# Patient Record
Sex: FEMALE | Race: WHITE | Employment: FULL TIME | URBAN - METROPOLITAN AREA
[De-identification: names, ages, dates, MRNs, and addresses within clinical notes are randomized per-mention and may not be internally consistent; named-entity substitution may affect disease eponyms.]

---

## 2024-07-03 ENCOUNTER — HOSPITAL ENCOUNTER (INPATIENT)
Age: 19
LOS: 2 days | Discharge: HOME | End: 2024-07-05
Attending: PSYCHIATRY & NEUROLOGY | Admitting: PSYCHIATRY & NEUROLOGY

## 2024-07-03 ENCOUNTER — HOSPITAL ENCOUNTER (EMERGENCY)
Age: 19
Discharge: ANOTHER HOSPITAL | End: 2024-07-03
Attending: STUDENT IN AN ORGANIZED HEALTH CARE EDUCATION/TRAINING PROGRAM

## 2024-07-03 VITALS
WEIGHT: 120 LBS | HEART RATE: 57 BPM | DIASTOLIC BLOOD PRESSURE: 80 MMHG | OXYGEN SATURATION: 98 % | RESPIRATION RATE: 14 BRPM | SYSTOLIC BLOOD PRESSURE: 131 MMHG | TEMPERATURE: 97.6 F

## 2024-07-03 DIAGNOSIS — E83.42 HYPOMAGNESEMIA: ICD-10-CM

## 2024-07-03 DIAGNOSIS — T50.904A OVERDOSE OF UNDETERMINED INTENT, INITIAL ENCOUNTER: ICD-10-CM

## 2024-07-03 DIAGNOSIS — R45.851 SUICIDAL IDEATION: Primary | ICD-10-CM

## 2024-07-03 PROBLEM — F32.A DEPRESSION, UNSPECIFIED: Status: ACTIVE | Noted: 2024-07-03

## 2024-07-03 LAB
ALBUMIN SERPL-MCNC: 4.3 G/DL (ref 3.4–5)
ALBUMIN/GLOB SERPL: 1.6 {RATIO} (ref 1.1–2.2)
ALP SERPL-CCNC: 54 U/L (ref 40–129)
ALT SERPL-CCNC: 13 U/L (ref 10–40)
AMPHETAMINES UR QL SCN>1000 NG/ML: ABNORMAL
ANION GAP SERPL CALCULATED.3IONS-SCNC: 12 MMOL/L (ref 3–16)
APAP SERPL-MCNC: 12 UG/ML (ref 10–30)
APAP SERPL-MCNC: <5 UG/ML (ref 10–30)
AST SERPL-CCNC: 17 U/L (ref 15–37)
BARBITURATES UR QL SCN>200 NG/ML: ABNORMAL
BASOPHILS # BLD: 0 K/UL (ref 0–0.2)
BASOPHILS NFR BLD: 0.4 %
BENZODIAZ UR QL SCN>200 NG/ML: ABNORMAL
BILIRUB SERPL-MCNC: 0.4 MG/DL (ref 0–1)
BUN SERPL-MCNC: 12 MG/DL (ref 7–20)
CALCIUM SERPL-MCNC: 9 MG/DL (ref 8.3–10.6)
CANNABINOIDS UR QL SCN>50 NG/ML: POSITIVE
CHLORIDE SERPL-SCNC: 100 MMOL/L (ref 99–110)
CO2 SERPL-SCNC: 22 MMOL/L (ref 21–32)
COCAINE UR QL SCN: ABNORMAL
CREAT SERPL-MCNC: 0.7 MG/DL (ref 0.6–1.1)
DEPRECATED RDW RBC AUTO: 13.2 % (ref 12.4–15.4)
DRUG SCREEN COMMENT UR-IMP: ABNORMAL
EKG ATRIAL RATE: 80 BPM
EKG DIAGNOSIS: NORMAL
EKG P AXIS: 71 DEGREES
EKG P-R INTERVAL: 166 MS
EKG Q-T INTERVAL: 394 MS
EKG QRS DURATION: 80 MS
EKG QTC CALCULATION (BAZETT): 454 MS
EKG R AXIS: 59 DEGREES
EKG T AXIS: 30 DEGREES
EKG VENTRICULAR RATE: 80 BPM
EOSINOPHIL # BLD: 0 K/UL (ref 0–0.6)
EOSINOPHIL NFR BLD: 0.5 %
ETHANOLAMINE SERPL-MCNC: NORMAL MG/DL (ref 0–0.08)
FENTANYL SCREEN, URINE: ABNORMAL
GFR SERPLBLD CREATININE-BSD FMLA CKD-EPI: >90 ML/MIN/{1.73_M2}
GLUCOSE SERPL-MCNC: 115 MG/DL (ref 70–99)
HCG SERPL QL: NEGATIVE
HCT VFR BLD AUTO: 38.2 % (ref 36–48)
HGB BLD-MCNC: 13.1 G/DL (ref 12–16)
LYMPHOCYTES # BLD: 1.3 K/UL (ref 1–5.1)
LYMPHOCYTES NFR BLD: 16.3 %
MAGNESIUM SERPL-MCNC: 1.6 MG/DL (ref 1.8–2.4)
MCH RBC QN AUTO: 31.1 PG (ref 26–34)
MCHC RBC AUTO-ENTMCNC: 34.2 G/DL (ref 31–36)
MCV RBC AUTO: 90.8 FL (ref 80–100)
METHADONE UR QL SCN>300 NG/ML: ABNORMAL
MONOCYTES # BLD: 0.5 K/UL (ref 0–1.3)
MONOCYTES NFR BLD: 6.2 %
NEUTROPHILS # BLD: 5.9 K/UL (ref 1.7–7.7)
NEUTROPHILS NFR BLD: 76.6 %
OPIATES UR QL SCN>300 NG/ML: ABNORMAL
OXYCODONE UR QL SCN: ABNORMAL
PCP UR QL SCN>25 NG/ML: ABNORMAL
PH UR STRIP: 6 [PH]
PLATELET # BLD AUTO: 186 K/UL (ref 135–450)
PMV BLD AUTO: 8.4 FL (ref 5–10.5)
POTASSIUM SERPL-SCNC: 3.5 MMOL/L (ref 3.5–5.1)
PROT SERPL-MCNC: 7 G/DL (ref 6.4–8.2)
RBC # BLD AUTO: 4.21 M/UL (ref 4–5.2)
SALICYLATES SERPL-MCNC: <0.3 MG/DL (ref 15–30)
SARS-COV-2 RDRP RESP QL NAA+PROBE: NOT DETECTED
SODIUM SERPL-SCNC: 134 MMOL/L (ref 136–145)
WBC # BLD AUTO: 7.8 K/UL (ref 4–11)

## 2024-07-03 PROCEDURE — 93005 ELECTROCARDIOGRAM TRACING: CPT | Performed by: STUDENT IN AN ORGANIZED HEALTH CARE EDUCATION/TRAINING PROGRAM

## 2024-07-03 PROCEDURE — 6370000000 HC RX 637 (ALT 250 FOR IP): Performed by: STUDENT IN AN ORGANIZED HEALTH CARE EDUCATION/TRAINING PROGRAM

## 2024-07-03 PROCEDURE — 99285 EMERGENCY DEPT VISIT HI MDM: CPT

## 2024-07-03 PROCEDURE — 96365 THER/PROPH/DIAG IV INF INIT: CPT

## 2024-07-03 PROCEDURE — 85025 COMPLETE CBC W/AUTO DIFF WBC: CPT

## 2024-07-03 PROCEDURE — 87635 SARS-COV-2 COVID-19 AMP PRB: CPT

## 2024-07-03 PROCEDURE — 80143 DRUG ASSAY ACETAMINOPHEN: CPT

## 2024-07-03 PROCEDURE — 96366 THER/PROPH/DIAG IV INF ADDON: CPT

## 2024-07-03 PROCEDURE — 6360000002 HC RX W HCPCS: Performed by: STUDENT IN AN ORGANIZED HEALTH CARE EDUCATION/TRAINING PROGRAM

## 2024-07-03 PROCEDURE — 84703 CHORIONIC GONADOTROPIN ASSAY: CPT

## 2024-07-03 PROCEDURE — 1240000000 HC EMOTIONAL WELLNESS R&B

## 2024-07-03 PROCEDURE — 93010 ELECTROCARDIOGRAM REPORT: CPT | Performed by: INTERNAL MEDICINE

## 2024-07-03 PROCEDURE — 83735 ASSAY OF MAGNESIUM: CPT

## 2024-07-03 PROCEDURE — 82077 ASSAY SPEC XCP UR&BREATH IA: CPT

## 2024-07-03 PROCEDURE — 6370000000 HC RX 637 (ALT 250 FOR IP)

## 2024-07-03 PROCEDURE — 80053 COMPREHEN METABOLIC PANEL: CPT

## 2024-07-03 PROCEDURE — 80179 DRUG ASSAY SALICYLATE: CPT

## 2024-07-03 PROCEDURE — 36415 COLL VENOUS BLD VENIPUNCTURE: CPT

## 2024-07-03 PROCEDURE — 80307 DRUG TEST PRSMV CHEM ANLYZR: CPT

## 2024-07-03 RX ORDER — HYDROXYZINE HYDROCHLORIDE 25 MG/1
50 TABLET, FILM COATED ORAL 3 TIMES DAILY PRN
Status: CANCELLED | OUTPATIENT
Start: 2024-07-03

## 2024-07-03 RX ORDER — DIPHENHYDRAMINE HYDROCHLORIDE 50 MG/ML
50 INJECTION INTRAMUSCULAR; INTRAVENOUS EVERY 4 HOURS PRN
Status: DISCONTINUED | OUTPATIENT
Start: 2024-07-03 | End: 2024-07-04

## 2024-07-03 RX ORDER — RISPERIDONE 1 MG/1
1 TABLET ORAL 2 TIMES DAILY
Status: ON HOLD | COMMUNITY
End: 2024-07-05 | Stop reason: HOSPADM

## 2024-07-03 RX ORDER — TRAZODONE HYDROCHLORIDE 50 MG/1
50 TABLET ORAL NIGHTLY PRN
Status: DISCONTINUED | OUTPATIENT
Start: 2024-07-03 | End: 2024-07-05 | Stop reason: HOSPADM

## 2024-07-03 RX ORDER — MAGNESIUM HYDROXIDE/ALUMINUM HYDROXICE/SIMETHICONE 120; 1200; 1200 MG/30ML; MG/30ML; MG/30ML
30 SUSPENSION ORAL EVERY 6 HOURS PRN
Status: CANCELLED | OUTPATIENT
Start: 2024-07-03

## 2024-07-03 RX ORDER — MAGNESIUM HYDROXIDE/ALUMINUM HYDROXICE/SIMETHICONE 120; 1200; 1200 MG/30ML; MG/30ML; MG/30ML
30 SUSPENSION ORAL EVERY 6 HOURS PRN
Status: DISCONTINUED | OUTPATIENT
Start: 2024-07-03 | End: 2024-07-05 | Stop reason: HOSPADM

## 2024-07-03 RX ORDER — MAGNESIUM SULFATE IN WATER 40 MG/ML
2000 INJECTION, SOLUTION INTRAVENOUS ONCE
Status: COMPLETED | OUTPATIENT
Start: 2024-07-03 | End: 2024-07-03

## 2024-07-03 RX ORDER — HYDROXYZINE 50 MG/1
50 TABLET, FILM COATED ORAL 3 TIMES DAILY PRN
Status: DISCONTINUED | OUTPATIENT
Start: 2024-07-03 | End: 2024-07-05 | Stop reason: HOSPADM

## 2024-07-03 RX ORDER — ACETAMINOPHEN 325 MG/1
650 TABLET ORAL EVERY 4 HOURS PRN
Status: DISCONTINUED | OUTPATIENT
Start: 2024-07-03 | End: 2024-07-04

## 2024-07-03 RX ORDER — IBUPROFEN 400 MG/1
400 TABLET ORAL EVERY 6 HOURS PRN
Status: DISCONTINUED | OUTPATIENT
Start: 2024-07-03 | End: 2024-07-04

## 2024-07-03 RX ORDER — POLYETHYLENE GLYCOL 3350 17 G
2 POWDER IN PACKET (EA) ORAL
Status: CANCELLED | OUTPATIENT
Start: 2024-07-03

## 2024-07-03 RX ORDER — NICOTINE 21 MG/24HR
1 PATCH, TRANSDERMAL 24 HOURS TRANSDERMAL DAILY
Status: DISCONTINUED | OUTPATIENT
Start: 2024-07-03 | End: 2024-07-03 | Stop reason: HOSPADM

## 2024-07-03 RX ORDER — IBUPROFEN 400 MG/1
400 TABLET ORAL EVERY 6 HOURS PRN
Status: CANCELLED | OUTPATIENT
Start: 2024-07-03

## 2024-07-03 RX ORDER — OLANZAPINE 5 MG/1
5 TABLET ORAL EVERY 4 HOURS PRN
Status: DISCONTINUED | OUTPATIENT
Start: 2024-07-03 | End: 2024-07-04

## 2024-07-03 RX ORDER — POLYETHYLENE GLYCOL 3350 17 G
2 POWDER IN PACKET (EA) ORAL
Status: DISCONTINUED | OUTPATIENT
Start: 2024-07-03 | End: 2024-07-05 | Stop reason: HOSPADM

## 2024-07-03 RX ORDER — ACETAMINOPHEN 325 MG/1
650 TABLET ORAL EVERY 4 HOURS PRN
Status: CANCELLED | OUTPATIENT
Start: 2024-07-03

## 2024-07-03 RX ORDER — TRAZODONE HYDROCHLORIDE 50 MG/1
50 TABLET ORAL NIGHTLY PRN
Status: CANCELLED | OUTPATIENT
Start: 2024-07-03

## 2024-07-03 RX ORDER — DIPHENHYDRAMINE HYDROCHLORIDE 50 MG/ML
50 INJECTION INTRAMUSCULAR; INTRAVENOUS EVERY 4 HOURS PRN
Status: CANCELLED | OUTPATIENT
Start: 2024-07-03

## 2024-07-03 RX ORDER — OLANZAPINE 5 MG/1
5 TABLET ORAL EVERY 4 HOURS PRN
Status: CANCELLED | OUTPATIENT
Start: 2024-07-03

## 2024-07-03 RX ADMIN — ACETAMINOPHEN 650 MG: 325 TABLET ORAL at 21:50

## 2024-07-03 RX ADMIN — MAGNESIUM SULFATE HEPTAHYDRATE 2000 MG: 40 INJECTION, SOLUTION INTRAVENOUS at 12:15

## 2024-07-03 RX ADMIN — TRAZODONE HYDROCHLORIDE 50 MG: 50 TABLET ORAL at 21:50

## 2024-07-03 ASSESSMENT — SLEEP AND FATIGUE QUESTIONNAIRES
DO YOU HAVE DIFFICULTY SLEEPING: YES
SLEEP PATTERN: DIFFICULTY FALLING ASLEEP;INSOMNIA;NIGHTMARES/TERRORS;RESTLESSNESS
DO YOU USE A SLEEP AID: NO
AVERAGE NUMBER OF SLEEP HOURS: 4

## 2024-07-03 ASSESSMENT — PAIN SCALES - GENERAL
PAINLEVEL_OUTOF10: 0
PAINLEVEL_OUTOF10: 0
PAINLEVEL_OUTOF10: 6
PAINLEVEL_OUTOF10: 0

## 2024-07-03 ASSESSMENT — LIFESTYLE VARIABLES
HOW MANY STANDARD DRINKS CONTAINING ALCOHOL DO YOU HAVE ON A TYPICAL DAY: 1 OR 2
HOW MANY STANDARD DRINKS CONTAINING ALCOHOL DO YOU HAVE ON A TYPICAL DAY: 1 OR 2
HOW OFTEN DO YOU HAVE A DRINK CONTAINING ALCOHOL: MONTHLY OR LESS
HOW OFTEN DO YOU HAVE A DRINK CONTAINING ALCOHOL: MONTHLY OR LESS

## 2024-07-03 ASSESSMENT — PATIENT HEALTH QUESTIONNAIRE - PHQ9
SUM OF ALL RESPONSES TO PHQ9 QUESTIONS 1 & 2: 2
SUM OF ALL RESPONSES TO PHQ QUESTIONS 1-9: 2
2. FEELING DOWN, DEPRESSED OR HOPELESS: SEVERAL DAYS
SUM OF ALL RESPONSES TO PHQ QUESTIONS 1-9: 2
1. LITTLE INTEREST OR PLEASURE IN DOING THINGS: SEVERAL DAYS

## 2024-07-03 ASSESSMENT — PAIN DESCRIPTION - LOCATION: LOCATION: ABDOMEN

## 2024-07-03 ASSESSMENT — PAIN - FUNCTIONAL ASSESSMENT: PAIN_FUNCTIONAL_ASSESSMENT: 0-10

## 2024-07-03 ASSESSMENT — PAIN DESCRIPTION - DESCRIPTORS: DESCRIPTORS: CRAMPING

## 2024-07-03 NOTE — VIRTUAL HEALTH
which includes applicable co-pays. This virtual visit was conducted with patient's (and/or legal guardian's) consent. Patient identification was verified, and a caregiver was present when appropriate.  The patient was located at Facility (Appt Department): [facility]  ED  [address] [address]  Loc: [phone]  The provider was located at Home (City/State): [place]  Confirm you are appropriately licensed, registered, or certified to deliver care in the state where the patient is located as indicated above. If you are not or unsure, please re-schedule the visit: Yes, I confirm.   [place] Consult to Tele-Psych  Consult performed by: Kike Gómez LCSW  Consult ordered by: Chelsea Marcelino MD           Total time spent on this encounter: Not billed by time    --Kike Gómez LCSW on 7/3/2024 at 12:16 PM    An electronic signature was used to authenticate this note.

## 2024-07-03 NOTE — ED PROVIDER NOTES
Years of education: Not on file    Highest education level: Not on file   Occupational History    Not on file   Tobacco Use    Smoking status: Every Day     Types: Cigarettes    Smokeless tobacco: Never   Substance and Sexual Activity    Alcohol use: Never    Drug use: Yes     Types: Marijuana (Weed)    Sexual activity: Not on file   Other Topics Concern    Not on file   Social History Narrative    Not on file     Social Determinants of Health     Financial Resource Strain: Not on file   Food Insecurity: Not on file   Transportation Needs: Not on file   Physical Activity: Not on file   Stress: Not on file   Social Connections: Not on file   Intimate Partner Violence: Not on file   Housing Stability: Not on file         Differential diagnosis includes but is not limited to: Depression, anxiety, drug overdose, drug withdrawal, PTSD    WORKUP INTERPRETATION:  ED Course as of 07/03/24 1334   Wed Jul 03, 2024   1033 No leukocytosis, anemia, thrombocytopenia [ER]   1033 Patient is not pregnant [ER]   1033 Hyponatremia 134, hypomagnesemia to 1.6.  No other electrolyte abnormalities or evidence of kidney dysfunction. will give magnesium repletion [ER]   1033 Liver function testing unremarkable [ER]   1034 Ethanol and salicylate levels negative [ER]   1035 Acetaminophen level is 12.  Patient reports that she did take Tylenol this morning for menstrual cramps [ER]   1304 COVID swab negative [ER]   1304 The Ekg independently interpreted by me shows  normal sinus rhythm with a rate of 80  Axis is   Normal  QTc is  454  Intervals and Durations are unremarkable.      ST Segments: normal  No previous for comparison [ER]      ED Course User Index  [ER] Chelsea Marcelino MD       CONSULTS:   IP CONSULT TO TELE-PSYCH (SOCIAL WORK ONLY)    SCREENINGS:       [place] Coma Scale  Eye Opening: Spontaneous  Best Verbal Response: Oriented  Best Motor Response: Obeys commands  [place] Coma Scale Score: 15                CIWA Assessment  BP: 
TO TELE-PSYCH (SOCIAL WORK ONLY)-psychiatry evaluated the patient and recommended admission    I discussed this case with poison control who felt the patient could be medically cleared        SCREENINGS:       [place] Coma Scale  Eye Opening: Spontaneous  Best Verbal Response: Oriented  Best Motor Response: Obeys commands  [place] Coma Scale Score: 15                CIWA Assessment  BP: 123/85  Pulse: 84           Is this patient to be included in the SEP-1 Core Measure due to severe sepsis or septic shock?   No   Exclusion criteria - the patient is NOT to be included for SEP-1 Core Measure due to:  2+ SIRS criteria are not met      TREATMENT:  During the patient's ED course, the patient was given:  Medications   magnesium sulfate 2000 mg in 50 mL IVPB premix (2,000 mg IntraVENous New Bag 7/3/24 1215)        REASSESSMENT:  On reassessment, patient remained stable in the emergency department.  Laboratory studies remarkable for mild hypomagnesemia.  Repletion given.  Patient was initially tachycardic on arrival, resolved without intervention.  Discussed with poison control, patient is medically cleared for psychiatric evaluation.     I have performed a medical clearance examination on this patient.  It is my opinion that no medical conditions were discovered that would preclude admission to a behavioral health unit or discharge home.  I feel that the patient is medically stable for disposition by the behavioral health team at this time.    Psychiatry social work evaluated the patient and staffed the case with on-call psychiatry team. Decision made to admit the patient.    Vitals:    Vitals:    07/03/24 0956 07/03/24 1037 07/03/24 1214   BP: (!) 153/98  123/85   Pulse: (!) 106 95 84   Resp: 18  18   Temp: 97.6 °F (36.4 °C)     TempSrc: Oral     SpO2: 97%  97%   Weight: 54.4 kg (120 lb)         CRITICAL CARE TIME     I personally spent a total of 0 minutes of critical care time in obtaining history, performing a

## 2024-07-03 NOTE — ED NOTES
Transfer Center Handoff for Behavioral Health Transfers      Patient's Current Location: [facility]  ED     Chief Complaint   Patient presents with    Panic Attack     Has been taking risperidone for a week, does not feel like its working at all, feels sad, cried all day yesterday, pcp told her she needs to find a new provider for medications       Current or History of Violent Behavior: No    Currently in Restraints Now or During this Encounter: No  (Specify if Agitation or self harm is noted in ED?)  If yes, please describe behaviors requiring restraint:             Medical Clearance Documented and Verified in the Chart: Yes    No Known Allergies     Can Patient Tolerate Lying Flat: Yes    Able to Perform ADLs:  Yes  (Specify if able to ambulate or uses any mobility devices such as cane or walker)  Activity:    Level of Assistance:    Assistive Device:    Miscellaneous Devices:      LABS    CBC:   Lab Results   Component Value Date/Time    WBC 7.8 07/03/2024 10:08 AM    RBC 4.21 07/03/2024 10:08 AM    HGB 13.1 07/03/2024 10:08 AM    HCT 38.2 07/03/2024 10:08 AM    MCV 90.8 07/03/2024 10:08 AM    MCH 31.1 07/03/2024 10:08 AM    MCHC 34.2 07/03/2024 10:08 AM    RDW 13.2 07/03/2024 10:08 AM     07/03/2024 10:08 AM    MPV 8.4 07/03/2024 10:08 AM     CMP:   Lab Results   Component Value Date/Time     07/03/2024 10:08 AM    K 3.5 07/03/2024 10:08 AM     07/03/2024 10:08 AM    CO2 22 07/03/2024 10:08 AM    BUN 12 07/03/2024 10:08 AM    CREATININE 0.7 07/03/2024 10:08 AM    AGRATIO 1.6 07/03/2024 10:08 AM    LABGLOM >90 07/03/2024 10:08 AM    GLUCOSE 115 07/03/2024 10:08 AM    CALCIUM 9.0 07/03/2024 10:08 AM    BILITOT 0.4 07/03/2024 10:08 AM    ALKPHOS 54 07/03/2024 10:08 AM    AST 17 07/03/2024 10:08 AM    ALT 13 07/03/2024 10:08 AM     Drug Panel: No results found for: \"AMPHETAMUR\", \"BARBITURATUR\", \"COCAINEUR\", \"METHADU\", \"OPIAU\", \"THCUR\", \"LABCOMM\"  UA:No results found for: \"COLORU\",

## 2024-07-03 NOTE — ED NOTES
@7629 initiated SI protocols.   Pt in gown. 2 belongings bag, (1 with shoes and socks) (2nd bag Shirt,pants, medication bottle, phone, insurance card from registration, keys)  Pt in bed with bed rails up *2.   Pt on a mag drip   Everything is out of pt's room.   Pt has blankent on.

## 2024-07-03 NOTE — ED NOTES
Patient denies being able to urinate at this time, informed patient needing sample for BHI placement and will recheck in 30 minutes to obtain sample. Patient verbalized understanding.

## 2024-07-04 PROBLEM — E83.42 HYPOMAGNESEMIA: Status: ACTIVE | Noted: 2024-07-04

## 2024-07-04 PROBLEM — E87.1 HYPONATREMIA: Status: ACTIVE | Noted: 2024-07-04

## 2024-07-04 PROBLEM — F19.90 POLYSUBSTANCE USE DISORDER: Status: ACTIVE | Noted: 2024-07-04

## 2024-07-04 LAB
ANION GAP SERPL CALCULATED.3IONS-SCNC: 10 MMOL/L (ref 3–16)
BUN SERPL-MCNC: 14 MG/DL (ref 7–20)
CALCIUM SERPL-MCNC: 9.4 MG/DL (ref 8.3–10.6)
CHLORIDE SERPL-SCNC: 102 MMOL/L (ref 99–110)
CO2 SERPL-SCNC: 24 MMOL/L (ref 21–32)
CREAT SERPL-MCNC: 0.7 MG/DL (ref 0.6–1.1)
GFR SERPLBLD CREATININE-BSD FMLA CKD-EPI: >90 ML/MIN/{1.73_M2}
GLUCOSE SERPL-MCNC: 91 MG/DL (ref 70–99)
MAGNESIUM SERPL-MCNC: 1.9 MG/DL (ref 1.8–2.4)
POTASSIUM SERPL-SCNC: 3.9 MMOL/L (ref 3.5–5.1)
SODIUM SERPL-SCNC: 136 MMOL/L (ref 136–145)

## 2024-07-04 PROCEDURE — 80048 BASIC METABOLIC PNL TOTAL CA: CPT

## 2024-07-04 PROCEDURE — 6370000000 HC RX 637 (ALT 250 FOR IP)

## 2024-07-04 PROCEDURE — 1240000000 HC EMOTIONAL WELLNESS R&B

## 2024-07-04 PROCEDURE — 99221 1ST HOSP IP/OBS SF/LOW 40: CPT

## 2024-07-04 PROCEDURE — 36415 COLL VENOUS BLD VENIPUNCTURE: CPT

## 2024-07-04 PROCEDURE — 6370000000 HC RX 637 (ALT 250 FOR IP): Performed by: PSYCHIATRY & NEUROLOGY

## 2024-07-04 PROCEDURE — 83735 ASSAY OF MAGNESIUM: CPT

## 2024-07-04 RX ORDER — NICOTINE 21 MG/24HR
1 PATCH, TRANSDERMAL 24 HOURS TRANSDERMAL DAILY
Status: DISCONTINUED | OUTPATIENT
Start: 2024-07-04 | End: 2024-07-05 | Stop reason: HOSPADM

## 2024-07-04 RX ORDER — ESCITALOPRAM OXALATE 10 MG/1
10 TABLET ORAL DAILY
Status: DISCONTINUED | OUTPATIENT
Start: 2024-07-04 | End: 2024-07-05 | Stop reason: HOSPADM

## 2024-07-04 RX ORDER — ACETAMINOPHEN 325 MG/1
650 TABLET ORAL EVERY 8 HOURS PRN
Status: DISCONTINUED | OUTPATIENT
Start: 2024-07-04 | End: 2024-07-05 | Stop reason: HOSPADM

## 2024-07-04 RX ADMIN — NICOTINE POLACRILEX 2 MG: 2 LOZENGE ORAL at 21:38

## 2024-07-04 RX ADMIN — ESCITALOPRAM OXALATE 10 MG: 10 TABLET ORAL at 12:32

## 2024-07-04 RX ADMIN — TRAZODONE HYDROCHLORIDE 50 MG: 50 TABLET ORAL at 21:38

## 2024-07-04 RX ADMIN — NICOTINE POLACRILEX 2 MG: 2 LOZENGE ORAL at 15:09

## 2024-07-04 ASSESSMENT — PATIENT HEALTH QUESTIONNAIRE - PHQ9
2. FEELING DOWN, DEPRESSED OR HOPELESS: NEARLY EVERY DAY
7. TROUBLE CONCENTRATING ON THINGS, SUCH AS READING THE NEWSPAPER OR WATCHING TELEVISION: NEARLY EVERY DAY
SUM OF ALL RESPONSES TO PHQ QUESTIONS 1-9: 24
5. POOR APPETITE OR OVEREATING: MORE THAN HALF THE DAYS
9. THOUGHTS THAT YOU WOULD BE BETTER OFF DEAD, OR OF HURTING YOURSELF: NEARLY EVERY DAY
8. MOVING OR SPEAKING SO SLOWLY THAT OTHER PEOPLE COULD HAVE NOTICED. OR THE OPPOSITE, BEING SO FIGETY OR RESTLESS THAT YOU HAVE BEEN MOVING AROUND A LOT MORE THAN USUAL: SEVERAL DAYS
SUM OF ALL RESPONSES TO PHQ QUESTIONS 1-9: 24
10. IF YOU CHECKED OFF ANY PROBLEMS, HOW DIFFICULT HAVE THESE PROBLEMS MADE IT FOR YOU TO DO YOUR WORK, TAKE CARE OF THINGS AT HOME, OR GET ALONG WITH OTHER PEOPLE: SOMEWHAT DIFFICULT
1. LITTLE INTEREST OR PLEASURE IN DOING THINGS: NEARLY EVERY DAY
SUM OF ALL RESPONSES TO PHQ QUESTIONS 1-9: 21
3. TROUBLE FALLING OR STAYING ASLEEP: NEARLY EVERY DAY
4. FEELING TIRED OR HAVING LITTLE ENERGY: NEARLY EVERY DAY
SUM OF ALL RESPONSES TO PHQ9 QUESTIONS 1 & 2: 6
SUM OF ALL RESPONSES TO PHQ QUESTIONS 1-9: 24
6. FEELING BAD ABOUT YOURSELF - OR THAT YOU ARE A FAILURE OR HAVE LET YOURSELF OR YOUR FAMILY DOWN: NEARLY EVERY DAY

## 2024-07-04 ASSESSMENT — SLEEP AND FATIGUE QUESTIONNAIRES
SLEEP PATTERN: DIFFICULTY FALLING ASLEEP;DISTURBED/INTERRUPTED SLEEP;INSOMNIA;RESTLESSNESS;NIGHTMARES/TERRORS
AVERAGE NUMBER OF SLEEP HOURS: 4
DO YOU USE A SLEEP AID: NO
DO YOU HAVE DIFFICULTY SLEEPING: YES

## 2024-07-04 ASSESSMENT — PAIN SCALES - GENERAL: PAINLEVEL_OUTOF10: 0

## 2024-07-04 NOTE — CARE COORDINATION
SW met w/Pt at bedside to complete their psychosocial assessment, OQ analyst, and lifetime CSSR-S. The Pt was friendly and cooperative in answering/discussing the assessment questions.       07/04/24 0855   Psychiatric History   Psychiatric history treatment Psychiatric admissions  (previously been admitted inpatient at the [facility] and [facility]. PCP Brigid Lopez in-person therapy ([employer]))   Are there any medication issues? Yes  (Pt reports attempted OD 2 months ago and not taking meds appropriately prior to this admission.)   Recent Psychological Experiences Turmoil (comment)  (Pt reports abusing her meds in an attempt to manage her anxiety and denies that it was a suicide attempt. Pt endorses SI and that if she were to follow through on it she would OD. Pt is unsure of what is fueling her anxiety and SI.)   Support System   Support system None   Problems in support system Alienated/estranged;Lack of friends/family;Isolated   Current Living Situation   Home Living Adequate   Living information Lives alone  (Pt lived in an apartment alone.)   Problems with living situation  No   Lack of basic needs No   SSDI/SSI None   Other government assistance None   Problems with environment None   Current abuse issues Denies   Supervised setting None   Relationship problems No   Medical and Self-Care Issues   Relevant medical problems None   Relevant self-care issues None   Barriers to treatment No   Family Constellation   Spouse/partner-name/age None   Children-names/ages None   Parents Estanged   Siblings Estanged   Support services   (None)   Childhood   Raised by Biological mother;Biological father   Biological mother Estranged   Biological father Estranged   Relevant family history None reported   History of abuse No   Legal History   Legal history No   Juvenile legal history No   Abuse Assessment   Physical abuse Denies   Verbal abuse Denies   Emotional abuse Denies   Financial

## 2024-07-04 NOTE — PROGRESS NOTES
Behavioral Services  Medicare Certification Upon Admission    I certify that this patient's inpatient psychiatric hospital admission is medically necessary for:    [x] (1) Treatment which could reasonably be expected to improve this patient's condition,       [x] (2) Or for diagnostic study;     AND     [x](2) The inpatient psychiatric services are provided while the individual is under the care of a physician and are included in the individualized plan of care.    Estimated length of stay/service 4-6 days    Plan for post-hospital care incomplete    Electronically signed by ELIOT CASTLE MD on 7/4/2024 at 10:55 AM

## 2024-07-04 NOTE — ED NOTES
Patient verbalizes \"I don't want to be here, I don't want to be in this hospital, I don't want to be alive\" \"What happens if I just leave?\" And \"so I just fucked myself over by coming here\"

## 2024-07-04 NOTE — PROGRESS NOTES
Home Medication Reconciliation Status          [] COMPLETE       Medication history has been reviewed and obtained from the following source(s):       [] patient/family verbal report             [] patient/family provided written list       [] external pharmacy   [] external facility list         []  Provider notified that home medication reconciliation is complete          [x] IN PROGRESS       Medication reconciliation marked in progress at this time due to:       [] patient/family poor historian      [] waiting arrival of family to clarify       [] waiting for accurate list        [x] external pharmacy needs called      * Follow up is needed.          [] UNABLE TO ASSESS       Medication reconciliation is incomplete and unable to assess at this time due to:       [] critical patient condition   [] patient is unresponsive        [] no family available                       [] unknown pharmacy       [] anonymous patient          * Follow up is needed.      [] Pharmacy consult placed for medication reconciliation assistance   Additional comments: Amalia reports that she takes Risperdal 1 mg PO BID and picks up medications from [facility] on [address] - [phone]

## 2024-07-04 NOTE — ED NOTES
Patient sent with facesheet, pink slip original copy, ED care timeline, ambulance sheet, and EMTALA sent with [facility] transport. All documented patient belongings given to transporting staff in belongings bags with patient labels.

## 2024-07-04 NOTE — PROGRESS NOTES
4 Eyes Skin Assessment     NAME:  Amalia Henson  YOB: 2005  MEDICAL RECORD NUMBER:  [id number]    The patient is being assessed for  Admission    I agree that at least one RN has performed a thorough Head to Toe Skin Assessment on the patient. ALL assessment sites listed below have been assessed.      Areas assessed by both nurses:    Head, Face, Ears, Shoulders, Back, Chest, Arms, Elbows, Hands, Sacrum. Buttock, Coccyx, Ischium, and Legs. Feet and Heels        Does the Patient have a Wound? No noted wound(s)       Alexis Prevention initiated by RN: No  Wound Care Orders initiated by RN: No    Pressure Injury (Stage 3,4, Unstageable, DTI, NWPT, and Complex wounds) if present, place Wound referral order by RN under : No    New Ostomies, if present place, Ostomy referral order under : No     Nurse 1 eSignature: Electronically signed by Jessi Zamudio RN on 7/3/24 at 11:24 PM EDT    **SHARE this note so that the co-signing nurse can place an eSignature**    Nurse 2 eSignature: Electronically signed by Corazon Davies RN on 7/3/24 at 11:25 PM EDT

## 2024-07-04 NOTE — PROGRESS NOTES
CSSR-S Assessment completed with patient who then scored HIGH RISK.     Provider Nerissa Simon, APRN-CNP, was notified of HIGH RISK score, via Telephone at 7145.      Were suicide precautions ordered: NO    If not ordered, justification as follows: Amalia denies current suicidal ideation, plan, and intent. She reports that she believes that she can demonstrate safe behavior while on this unit and is willing to reach out to staff if she feels that she can no longer maintain safe behavior at any point during this admission.     Completed by: Jessi POLO RN

## 2024-07-04 NOTE — ED NOTES
Patient verbalizes \"I've been here for 14 hours and nothing has happened\",  \"I've gone to these places before and it just makes everything worse\" and \"up at the front they told me that I wouldn't be admitted or put on a hold\"

## 2024-07-04 NOTE — PROGRESS NOTES
[facility]  Admission Note     Admission Type:   Admission Type: Involuntary    Reason for admission:  Reason for Admission: Depression; Suicidal ideation      Addictive Behavior:   Addictive Behavior  In the Past 3 Months, Have You Felt or Has Someone Told You That You Have a Problem With  : None    Medical Problems:   Past Medical History:   Diagnosis Date    Anxiety     Depression     PTSD (post-traumatic stress disorder)        Status EXAM:  Mental Status and Behavioral Exam  Normal: No  Level of Assistance: Independent/Self  Facial Expression: Flat, Worried  Affect: Congruent  Level of Consciousness: Alert  Frequency of Checks: 4 times per hour, close  Mood:Normal: No  Mood: Depressed, Anxious, Sad  Motor Activity:Normal: Yes  Eye Contact: Good  Observed Behavior: Cooperative, Withdrawn, Friendly  Sexual Misconduct History: Current - no  Preception: [place] to person, [place] to time, [place] to place, [place] to situation  Attention:Normal: Yes  Thought Processes: Unremarkable  Thought Content:Normal: Yes  Depression Symptoms: Feelings of helplessness, Feelings of hopelessess, Feelings of worthlessness, Loss of interest, Sleep disturbance  Anxiety Symptoms: Generalized  Rebecca Symptoms: No problems reported or observed.  Hallucinations: None (Amalia denies; not noted to be RTIS)  Delusions: No  Memory:Normal: Yes  Insight and Judgment: No  Insight and Judgment: Poor judgment, Poor insight    Tobacco Screening:  Practical Counseling, on admission, julio X, if applicable and completed (first 3 are required if patient doesn't refuse):            ( ) Recognizing danger situations (included triggers and roadblocks)                    ( ) Coping skills (new ways to manage stress,relaxation techniques, changing routine, distraction)                                                           ( ) Basic information about quitting (benefits of quitting, techniques in how to quit, available resources  ( )  [facility]                                                                                                                   (X) Patient refused counseling  ( ) Patient has not smoked in the last 30 days    Metabolic Screening:    No results found for: \"LABA1C\"    No results found for: \"CHOL\"  No results found for: \"TRIG\"  No results found for: \"HDL\"  No components found for: \"LDLCAL\"  No components found for: \"LABVLDL\"      Body mass index is 19.97 kg/m².    BP Readings from Last 2 Encounters:   07/03/24 133/86   07/03/24 131/80       Pt admitted with followings belongings:  Dental Appliances: None  Vision - Corrective Lenses: Eyeglasses, At bedside  Hearing Aid: None  Jewelry: Necklace (1 necklace- in safe)  Body Piercings Removed: N/A  Clothing: Pants, Shirt, Belt, Socks, Footwear, Undergarments (1 pair of jeans, 1 tshirt, 1 belt, 1 pair of socks, 1 pair of shoes, 1 pair of underwear - in locker except underwear - she's wearing those)  Other Valuables: Keys (1 set of keys - in safe)  The following personal items were collected during admission. Items secured in locker/safe. Items will be returned to patient at discharge.     Jessi Zamudio RN

## 2024-07-04 NOTE — PROGRESS NOTES
Amalia arrived on this unit from [facility] with two medical transporters and one security staff from this facility at 2115. Amalia was ambulatory with a steady gait upon arrival. Security staff performed a thorough assessment of Amalia and her belongings with a metal detector wand and no contraband was found in Amalia's belongings or on her person. Amalia is alert and oriented x4, calm, and cooperative. She denies current suicidal and homicidal ideation, as well as auditory and visual hallucinations. This writer offered Amalia a beverage and a snack, but she declined. This writer oriented Amalia to the unit and to her room. She is agreeable to participate in the admission process.
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
## 2024-07-04 NOTE — H&P
Hospital Medicine History & Physical      PCP: Natalya Persaud MD    Date of Admission: 7/3/2024    Date of Service: Pt seen/examined on 7/4/2024     Chief Complaint:  No chief complaint on file.        History Of Present Illness:      The patient is a 18 y.o. female with pmhx of anxiety who presented to [facility] for anxiety and suicidal ideation.  Patient was seen and evaluated in the ED by the ED medical provider, patient was medically cleared for admission to [facility].  This note serves as an admission medical H&P.    Tobacco use: Daily   ETOH use: None   Illicit drug use: Marijuana    Patient is complaining of some poor appetite. No abdominal pain or vomiting.    Past Medical History:        Diagnosis Date    Anxiety     Depression     PTSD (post-traumatic stress disorder)        Past Surgical History:    History reviewed. No pertinent surgical history.    Medications Prior to Admission:    Prior to Admission medications    Medication Sig Start Date End Date Taking? Authorizing Provider   risperiDONE (RISPERDAL) 1 MG tablet Take 1 tablet by mouth 2 times daily    ProviderGolden MD       Allergies:  Patient has no known allergies.    Social History:      TOBACCO:   reports that she has been smoking cigarettes. She has never used smokeless tobacco.  ETOH:   reports no history of alcohol use.      Family History:   Positive as follows:    History reviewed. No pertinent family history.    REVIEW OF SYSTEMS:       Constitutional: Negative for fever   HENT: Negative for sore throat   Eyes: Negative for redness   Respiratory: Negative  for dyspnea, cough   Cardiovascular: Negative for chest pain   Gastrointestinal: Negative for vomiting, diarrhea   Genitourinary: Negative for hematuria   Musculoskeletal: Negative for arthralgias   Skin: Negative for rash   Neurological: Negative for syncope    Hematological: Negative for easy bruising/bleeding   Psychiatric/Behavorial: Per psychiatry team evaluation      PHYSICAL EXAM:    /86   Pulse 75   Temp 97.1 °F (36.2 °C) (Oral)   Resp 16   Ht 1.651 m (5' 5\")   Wt 54.4 kg (120 lb)   LMP 07/03/2024 (Exact Date)   SpO2 98%   Breastfeeding No   BMI 19.97 kg/m²     Gen: No distress. Alert. +Young female   Eyes: PERRL. No sclera icterus. No conjunctival injection.   Neck: No JVD.  No Carotid Bruit. Trachea midline.  Resp: No accessory muscle use. No crackles. No wheezes. No rhonchi.   CV: Regular rate. Regular rhythm. No murmur.  No rub. No edema.   GI: Non-tender. Non-distended. Normal bowel sounds.   Skin: Warm and dry. No nodule on exposed extremities. No rash on exposed extremities.   M/S: No cyanosis. No joint deformity. No clubbing.   Neuro: Awake. No focal neurologic deficit on exam.  Cranial nerves II through XII intact.  Patient is able to ambulate without difficulty.  Psych: Per psychiatry team evaluation     Lab Results   Component Value Date    WBC 7.8 07/03/2024    HGB 13.1 07/03/2024    HCT 38.2 07/03/2024    MCV 90.8 07/03/2024     07/03/2024     Lab Results   Component Value Date     (L) 07/03/2024    K 3.5 07/03/2024     07/03/2024    CO2 22 07/03/2024    BUN 12 07/03/2024    CREATININE 0.7 07/03/2024    GLUCOSE 115 (H) 07/03/2024    CALCIUM 9.0 07/03/2024    BILITOT 0.4 07/03/2024    ALKPHOS 54 07/03/2024    AST 17 07/03/2024    ALT 13 07/03/2024    LABGLOM >90 07/03/2024    AGRATIO 1.6 07/03/2024           CULTURES  Results       Procedure Component Value Units Date/Time    COVID-19, Rapid [[id number]] Collected: 07/03/24 1008    Order Status: Completed Specimen: Nasopharyngeal Swab Updated: 07/03/24 1149     SARS-CoV-2, NAAT Not Detected     Comment: Rapid NAAT:   Negative results should be treated as presumptive and,  if inconsistent with clinical signs and symptoms or necessary for  patient management, should be tested with an alternative molecular  assay. Negative results do not preclude SARS-CoV-2 infection and  should

## 2024-07-04 NOTE — ED NOTES
Constant observer discontinued @2045 and handed off to [facility]. Patient in paper gown with all belongings bagged and given to transport personnel.

## 2024-07-04 NOTE — PROGRESS NOTES
Amalia presented to [place] ED complaining of anxiety & issues with her medications. She reports that she began taking Risperdal 1 mg PO BID one week ago & doesn't feel like it is working. She reports feeling very sad and crying all day yesterday (7/2/24). Amalia's PCP encouraged her to go to the ED to be seen and also told her that she would have to find a new doctor to prescribe medications.     ED staff noted that Amalia was evasive during triage, but eventually endorsed suicidal ideation. Amalia reports that she ingested 5 Atarax 50 mg pills on 7/2/2024, but states that it was not a suicide attempt. She told ED staff that she took the pills because she wanted to sleep. Amalia has previously attempted suicide by overdosing on Atarax. She reports that her most recent attempt was 2 months ago. She stated that she fell asleep and did not seek medical attention when she woke up.     Amalia told this writer that she has previously been admitted inpatient at the [facility]. She reports that she was there for 1 month and doesn't feel like it was helpful. She also told this writer that [facility] [place] as an adolescent [facility] times as well.     Upon arrival to this unit, Amalia is calm and cooperative, but states she is anxious and nervous. She asked if this writer thinks that the psychiatrist could prescribe a different type of medication \"for panic\". This writer provided emotional support and encouraged Amalia to speak to the psychiatrist in the morning.     Amalia stated that she had court earlier this month because she received a speeding ticket. She apparently had her license suspended and is now having transportation issues.     Amalia reports that she feels that nobody cares about her. When this writer asked Amalia about this statement, she stated that she doesn't have a relationship with her family.    Amalia reports decreased sleep due to insomnia and nightmares.

## 2024-07-04 NOTE — PLAN OF CARE
Problem: Self Harm/Suicidality  Goal: Will have no self-injury during hospital stay  Description: INTERVENTIONS:  1.  Ensure constant observer at bedside with Q15M safety checks  2.  Maintain a safe environment  3.  Secure patient belongings  4.  Ensure family/visitors adhere to safety recommendations  5.  Ensure safety tray has been added to patient's diet order  6.  Every shift and PRN: Re-assess suicidal risk via Frequent Screener    Outcome: Completed     Problem: Anxiety  Goal: Will report anxiety at manageable levels  Description: INTERVENTIONS:  1. Administer medication as ordered  2. Teach and rehearse alternative coping skills  3. Provide emotional support with 1:1 interaction with staff  Outcome: Progressing     Problem: Coping  Goal: Pt/Family able to verbalize concerns and demonstrate effective coping strategies  Description: INTERVENTIONS:  1. Assist patient/family to identify coping skills, available support systems and cultural and spiritual values  2. Provide emotional support, including active listening and acknowledgement of concerns of patient and caregivers  3. Reduce environmental stimuli, as able  4. Instruct patient/family in relaxation techniques, as appropriate  5. Assess for spiritual pain/suffering and initiate Spiritual Care, Psychosocial Clinical Specialist consults as needed  Outcome: Progressing     Problem: Depression/Self Harm  Goal: Effect of psychiatric condition will be minimized and patient will be protected from self harm  Description: INTERVENTIONS:  1. Assess impact of patient's symptoms on level of functioning, self care needs and offer support as indicated  2. Assess patient/family knowledge of depression, impact on illness and need for teaching  3. Provide emotional support, presence and reassurance  4. Assess for possible suicidal thoughts or ideation. If patient expresses suicidal thoughts or statements do not leave alone, initiate Suicide Precautions, move to a room  close to the nursing station and obtain sitter  5. Initiate consults as appropriate with Mental Health Professional, Spiritual Care, Psychosocial CNS, and consider a recommendation to the LIP for a Psychiatric Consultation  Outcome: Progressing

## 2024-07-04 NOTE — PLAN OF CARE
Problem: Anxiety  Goal: Will report anxiety at manageable levels  Description: INTERVENTIONS:  1. Administer medication as ordered  2. Teach and rehearse alternative coping skills  3. Provide emotional support with 1:1 interaction with staff  Outcome: Progressing  Flowsheets (Taken 7/4/2024 8848)  Will report anxiety at manageable levels:   Provide emotional support with 1:1 interaction with staff   Teach and rehearse alternative coping skills     Problem: Coping  Goal: Pt/Family able to verbalize concerns and demonstrate effective coping strategies  Description: INTERVENTIONS:  1. Assist patient/family to identify coping skills, available support systems and cultural and spiritual values  2. Provide emotional support, including active listening and acknowledgement of concerns of patient and caregivers  3. Reduce environmental stimuli, as able  4. Instruct patient/family in relaxation techniques, as appropriate  5. Assess for spiritual pain/suffering and initiate Spiritual Care, Psychosocial Clinical Specialist consults as needed  Outcome: Progressing     Problem: Depression/Self Harm  Goal: Effect of psychiatric condition will be minimized and patient will be protected from self harm  Description: INTERVENTIONS:  1. Assess impact of patient's symptoms on level of functioning, self care needs and offer support as indicated  2. Assess patient/family knowledge of depression, impact on illness and need for teaching  3. Provide emotional support, presence and reassurance  4. Assess for possible suicidal thoughts or ideation. If patient expresses suicidal thoughts or statements do not leave alone, initiate Suicide Precautions, move to a room close to the nursing station and obtain sitter  5. Initiate consults as appropriate with Mental Health Professional, Spiritual Care, Psychosocial CNS, and consider a recommendation to the LIP for a Psychiatric Consultation  Outcome: Progressing     Problem: Pain  Goal:  Verbalizes/displays adequate comfort level or baseline comfort level  Outcome: Progressing

## 2024-07-05 VITALS
HEART RATE: 83 BPM | RESPIRATION RATE: 16 BRPM | DIASTOLIC BLOOD PRESSURE: 78 MMHG | TEMPERATURE: 97.1 F | HEIGHT: 65 IN | OXYGEN SATURATION: 97 % | BODY MASS INDEX: 19.99 KG/M2 | WEIGHT: 120 LBS | SYSTOLIC BLOOD PRESSURE: 128 MMHG

## 2024-07-05 PROBLEM — E83.42 HYPOMAGNESEMIA: Status: RESOLVED | Noted: 2024-07-04 | Resolved: 2024-07-05

## 2024-07-05 PROBLEM — E87.1 HYPONATREMIA: Status: RESOLVED | Noted: 2024-07-04 | Resolved: 2024-07-05

## 2024-07-05 PROCEDURE — 6370000000 HC RX 637 (ALT 250 FOR IP)

## 2024-07-05 PROCEDURE — 6370000000 HC RX 637 (ALT 250 FOR IP): Performed by: PSYCHIATRY & NEUROLOGY

## 2024-07-05 PROCEDURE — 5130000000 HC BRIDGE APPOINTMENT

## 2024-07-05 RX ORDER — ESCITALOPRAM OXALATE 10 MG/1
10 TABLET ORAL DAILY
Qty: 30 TABLET | Refills: 0 | Status: SHIPPED | OUTPATIENT
Start: 2024-07-06

## 2024-07-05 RX ORDER — HYDROXYZINE HYDROCHLORIDE 25 MG/1
25 TABLET, FILM COATED ORAL 3 TIMES DAILY PRN
COMMUNITY

## 2024-07-05 RX ORDER — TRAZODONE HYDROCHLORIDE 50 MG/1
50 TABLET ORAL NIGHTLY PRN
Qty: 30 TABLET | Refills: 0 | Status: SHIPPED | OUTPATIENT
Start: 2024-07-05 | End: 2024-08-04

## 2024-07-05 RX ADMIN — NICOTINE POLACRILEX 2 MG: 2 LOZENGE ORAL at 09:12

## 2024-07-05 RX ADMIN — ESCITALOPRAM OXALATE 10 MG: 10 TABLET ORAL at 08:19

## 2024-07-05 ASSESSMENT — PAIN SCALES - GENERAL: PAINLEVEL_OUTOF10: 0

## 2024-07-05 NOTE — PLAN OF CARE
Problem: Anxiety  Goal: Will report anxiety at manageable levels  Description: INTERVENTIONS:  1. Administer medication as ordered  2. Teach and rehearse alternative coping skills  3. Provide emotional support with 1:1 interaction with staff  7/5/2024 0849 by Luke Farias RN  Outcome: Progressing  7/4/2024 2353 by Nano Hinds RN  Outcome: Progressing     Problem: Coping  Goal: Pt/Family able to verbalize concerns and demonstrate effective coping strategies  Description: INTERVENTIONS:  1. Assist patient/family to identify coping skills, available support systems and cultural and spiritual values  2. Provide emotional support, including active listening and acknowledgement of concerns of patient and caregivers  3. Reduce environmental stimuli, as able  4. Instruct patient/family in relaxation techniques, as appropriate  5. Assess for spiritual pain/suffering and initiate Spiritual Care, Psychosocial Clinical Specialist consults as needed  7/5/2024 0849 by Luke Farias RN  Outcome: Progressing  7/4/2024 2353 by Nano Hinds RN  Outcome: Progressing     Problem: Depression/Self Harm  Goal: Effect of psychiatric condition will be minimized and patient will be protected from self harm  Description: INTERVENTIONS:  1. Assess impact of patient's symptoms on level of functioning, self care needs and offer support as indicated  2. Assess patient/family knowledge of depression, impact on illness and need for teaching  3. Provide emotional support, presence and reassurance  4. Assess for possible suicidal thoughts or ideation. If patient expresses suicidal thoughts or statements do not leave alone, initiate Suicide Precautions, move to a room close to the nursing station and obtain sitter  5. Initiate consults as appropriate with Mental Health Professional, Spiritual Care, Psychosocial CNS, and consider a recommendation to the LIP for a Psychiatric Consultation  7/5/2024 0849 by Luke Farias,

## 2024-07-05 NOTE — PLAN OF CARE
Problem: Anxiety  Goal: Will report anxiety at manageable levels  Description: INTERVENTIONS:  1. Administer medication as ordered  2. Teach and rehearse alternative coping skills  3. Provide emotional support with 1:1 interaction with staff  7/4/2024 2353 by Nano Hinds RN  Outcome: Progressing  7/4/2024 1537 by Milana Pate RN  Outcome: Progressing  Flowsheets (Taken 7/4/2024 1417)  Will report anxiety at manageable levels:   Provide emotional support with 1:1 interaction with staff   Teach and rehearse alternative coping skills     Problem: Coping  Goal: Pt/Family able to verbalize concerns and demonstrate effective coping strategies  Description: INTERVENTIONS:  1. Assist patient/family to identify coping skills, available support systems and cultural and spiritual values  2. Provide emotional support, including active listening and acknowledgement of concerns of patient and caregivers  3. Reduce environmental stimuli, as able  4. Instruct patient/family in relaxation techniques, as appropriate  5. Assess for spiritual pain/suffering and initiate Spiritual Care, Psychosocial Clinical Specialist consults as needed  7/4/2024 2353 by Nano Hinds RN  Outcome: Progressing  7/4/2024 1537 by Milana Pate RN  Outcome: Progressing     Problem: Depression/Self Harm  Goal: Effect of psychiatric condition will be minimized and patient will be protected from self harm  Description: INTERVENTIONS:  1. Assess impact of patient's symptoms on level of functioning, self care needs and offer support as indicated  2. Assess patient/family knowledge of depression, impact on illness and need for teaching  3. Provide emotional support, presence and reassurance  4. Assess for possible suicidal thoughts or ideation. If patient expresses suicidal thoughts or statements do not leave alone, initiate Suicide Precautions, move to a room close to the nursing station and obtain sitter  5. Initiate consults as

## 2024-07-05 NOTE — BH NOTE
Patient alert and oriented x 2 disoriented x place. Patient rates Depression 9/10 and Anxiety 6/10. Patient visible and social on the milieu. Patient denies SI/HI/A/V/H. Patient doesnm't have HS medications scheduled. Patient eat a HS snack. Patient denies self harm. Patient resting quietly inbed with eyes closed. PRN Trazodone was effective foe sleep. NO c/o's voiced @ present.

## 2024-07-05 NOTE — TRANSITION OF CARE
Behavioral Health Transition Record    Patient Name: Amalia Henson  YOB: 2005   Medical Record Number: [id number]  Date of Admission: 7/3/2024  9:16 PM   Date of Discharge: 7/5/24    Attending Provider: John Rivera MD   Discharging Provider:  John Rivera MD   To contact this individual call [phone] and ask the  to page.  If unavailable, ask to be transferred to Behavioral Health Provider on call.  A Behavioral Health Provider will be available on call 24/7 and during holidays.    Primary Care Provider: Natalya Persaud MD    No Known Allergies    Reason for Admission: This is a domiciled, never , and fully employed 18-year-old with a history of depression, PTSD, and substance use, who self-presented to [facility] Emergency Department complaining of of anxiety, depression, and suicidal ideation. This is a domiciled, never , and fully employed 18-year-old with a history of depression, PTSD, and substance use, who self-presented to [facility] Emergency Department complaining of of anxiety, depression, and suicidal ideation.     Admission Diagnosis: Depression, unspecified [F32.A]    * No surgery found *    Results for orders placed or performed during the hospital encounter of 07/03/24   Basic Metabolic Panel   Result Value Ref Range    Sodium 136 136 - 145 mmol/L    Potassium 3.9 3.5 - 5.1 mmol/L    Chloride 102 99 - 110 mmol/L    CO2 24 21 - 32 mmol/L    Anion Gap 10 3 - 16    Glucose 91 70 - 99 mg/dL    BUN 14 7 - 20 mg/dL    Creatinine 0.7 0.6 - 1.1 mg/dL    Est, Glom Filt Rate >90 >60    Calcium 9.4 8.3 - 10.6 mg/dL   Magnesium   Result Value Ref Range    Magnesium 1.90 1.80 - 2.40 mg/dL       Immunizations administered during this encounter:   There is no immunization history on file for this patient.  Influenza Vaccination Status: None of the above/Not documented/Unable to determine from medical record documentation    Screening for Metabolic  LEXAPRO  Take 1 tablet by mouth daily  Start taking on: July 6, 2024     traZODone 50 MG tablet  Commonly known as: DESYREL  Take 1 tablet by mouth nightly as needed for Sleep            CONTINUE taking these medications      hydrOXYzine HCl 25 MG tablet  Commonly known as: ATARAX            STOP taking these medications      risperiDONE 1 MG tablet  Commonly known as: RISPERDAL               Where to Get Your Medications        These medications were sent to [facility]  - [place] - [address] - P [phone] - F [phone]  [address], [place] [address]      Phone: [phone]   escitalopram 10 MG tablet  traZODone 50 MG tablet         Unresulted Labs (24h ago, onward)      None            To obtain results of studies pending at discharge, please contact [phone]    Follow-up Information       Follow up With Specialties Details Why Contact Info    [facility] Intensive Outpatient Therapy  Schedule an appointment as soon as possible for a visit Call Effie Ortega @ [phone] to schedule an intake appointment [facility]  Address: [address] [place] [address] [place], [facility]  [phone]    Natalya Persaud MD  Follow up on 7/11/2024 Hospital Discharge Follow Up @ 10:15am [address] [facility] [address] [phone]               Advanced Directive:   Does the patient have an appointed surrogate decision maker? No  Does the patient have a Medical Advance Directive? No  Does the patient have a Psychiatric Advance Directive? No  If the patient does not have a surrogate or Medical Advance Directive AND Psychiatric Advance Directive, the patient was offered information on these advance directives Patient will complete at a later time    Patient Instructions: Please continue all medications until otherwise directed by physician.  [phone]    Tobacco Cessation Discharge Plan:   Is the patient a tobacco user  and needs referral

## 2024-07-05 NOTE — PROGRESS NOTES
Behavioral Health [place]  Discharge Note    Pt discharged with followings belongings:   Dental Appliances: None  Vision - Corrective Lenses: Eyeglasses, At bedside  Hearing Aid: None  Jewelry: Necklace (1 necklace- in safe)  Body Piercings Removed: N/A  Clothing: Pants, Shirt, Belt, Socks, Footwear, Undergarments (1 pair of jeans, 1 tshirt, 1 belt, 1 pair of socks, 1 pair of shoes, 1 pair of underwear - in locker except underwear - she's wearing those)  Other Valuables: Keys (1 set of keys - in safe)   Valuables  returned to patient. Patient educated on aftercare instructions: yes  .Patient verbalize understanding of AVS:  yes.    Status EXAM upon discharge:  Mental Status and Behavioral Exam  Normal: No  Level of Assistance: Independent/Self  Facial Expression: Flat, Sad  Affect: Blunt, Constricted  Level of Consciousness: Lethargic  Frequency of Checks: 4 times per hour, close  Mood:Normal: No  Mood: Anxious, Depressed, Worthless, low self-esteem, Sad  Motor Activity:Normal: No  Motor Activity: Decreased  Eye Contact: Fair  Observed Behavior: Guarded, Withdrawn, Cooperative  Sexual Misconduct History: Current - no  Preception: [place] to person, [place] to time, [place] to place, [place] to situation  Attention:Normal: No  Attention: Unable to concentrate  Thought Processes: Perseveration  Thought Content:Normal: No  Thought Content: Poverty of content  Depression Symptoms: Feelings of worthlessness, Impaired concentration, Loss of interest, Change in energy level, Appetite change, Feelings of hopelessess, Sleep disturbance  Anxiety Symptoms: Generalized, Panic attack, Other (comment), Palpitations (pt reports shaking and racing heart)  Rebecca Symptoms: No problems reported or observed.  Hallucinations: None, Other (comment) (pt denies and no s/s of RTIS noted)  Delusions: No  Memory:Normal: No  Memory: Poor recent  Insight and Judgment: No  Insight and Judgment: Poor judgment, Poor insight, Unmotivated    Tobacco

## 2024-07-05 NOTE — GROUP NOTE
Group Therapy Note    Date: 7/5/2024    Group Start Time: 1100  Group End Time: 1145  Group Topic: Activity    [facility] Yany Norris MSW        Group Therapy Note    Attendees: 4      The facilitator led a relaxation and grounding skills group. Patients were introduced to guided meditation as a mindfulness skill and discussed the benefits the practice can have on physical and mental health. The facilitator then walked patients through a breathing and guided meditation exercise to promote relaxation. The group concluded with a reflective discussion on the experience.         Notes:  Patient engaged in activity and group discussion. Pt endorsed slightly relaxation but expressed difficulty managing moderate levels of anxiety. Pt shared it was difficult to keep body still during intervention due to anxious energy.     Status After Intervention:  Improved    Participation Level: Active Listener and Interactive    Participation Quality: Appropriate and Attentive      Speech:  normal      Thought Process/Content: Logical  Linear      Affective Functioning: Congruent      Mood: anxious      Level of consciousness:  Alert and Attentive      Response to Learning: Able to verbalize current knowledge/experience, Able to verbalize/acknowledge new learning, Capable of insight, Able to change behavior, and Progressing to goal      Endings: None Reported    Modes of Intervention: Education, Exploration, and Activity      Discipline Responsible: /Counselor      Signature:  SINAN Rivera

## 2024-07-05 NOTE — PROGRESS NOTES
Bridge Appointment completed: Reviewed Discharge Instructions with patient.    Patient verbalizes understanding and agreement with the discharge plan using the teachback method.     Vaccinations (julio X if applicable and completed):  ( ) Patient states already received influenza vaccine elsewhere  ( ) Patient received influenza vaccine during this hospitalization  ( ) Patient refused influenza vaccine at this time  (x ) Not offered

## 2024-07-05 NOTE — BH NOTE
Patient alert and oriented x 2 disoriented to place. Patient rates Depression 9/10 and Anxiety 6/10. Patient visible and social on the milieu. Patient denies SI/HI/A/V/H. Patient doesn't have scheduled HS medications. Patient eat a HS snack. Patient denies self harm. Patient resting quietly in bed with eyes closed. PRN Zyprexa was effective for agitation. No c/o's voiced @ present.
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
## 2024-07-05 NOTE — BH NOTE
Patient requesting nicotine lozenge nicotine cravings and Trazodone for sleep. Patient medicated with Nicotine Lozenge 2 mg po and Trazodone 50 mg po for sleep.

## 2024-07-08 ENCOUNTER — FOLLOWUP TELEPHONE ENCOUNTER (OUTPATIENT)
Dept: PSYCHIATRY | Age: 19
End: 2024-07-08

## 2024-07-09 ENCOUNTER — FOLLOWUP TELEPHONE ENCOUNTER (OUTPATIENT)
Dept: PSYCHIATRY | Age: 19
End: 2024-07-09

## 2024-07-10 ENCOUNTER — FOLLOWUP TELEPHONE ENCOUNTER (OUTPATIENT)
Dept: PSYCHIATRY | Age: 19
End: 2024-07-10

## 2025-06-05 ENCOUNTER — HOSPITAL ENCOUNTER (OUTPATIENT)
Age: 20
Setting detail: OBSERVATION
Discharge: HOME | End: 2025-06-07
Attending: EMERGENCY MEDICINE | Admitting: PSYCHIATRY & NEUROLOGY

## 2025-06-05 DIAGNOSIS — F10.920 ACUTE ALCOHOLIC INTOXICATION WITHOUT COMPLICATION: ICD-10-CM

## 2025-06-05 DIAGNOSIS — R45.6 VIOLENT BEHAVIOR: Primary | ICD-10-CM

## 2025-06-05 DIAGNOSIS — R45.851 THREATENING SUICIDE: ICD-10-CM

## 2025-06-05 LAB
ALBUMIN SERPL-MCNC: 4.4 G/DL (ref 3.4–5)
ALBUMIN/GLOB SERPL: 2 {RATIO} (ref 1.1–2.2)
ALP SERPL-CCNC: 51 U/L (ref 40–129)
ALT SERPL-CCNC: 13 U/L (ref 10–40)
ANION GAP SERPL CALCULATED.3IONS-SCNC: 19 MMOL/L (ref 3–16)
APAP SERPL-MCNC: <5 UG/ML (ref 10–30)
AST SERPL-CCNC: 23 U/L (ref 15–37)
BASOPHILS # BLD: 0 K/UL (ref 0–0.2)
BASOPHILS NFR BLD: 0.3 %
BILIRUB SERPL-MCNC: 0.4 MG/DL (ref 0–1)
BUN SERPL-MCNC: 10 MG/DL (ref 7–20)
CALCIUM SERPL-MCNC: 8.7 MG/DL (ref 8.3–10.6)
CHLORIDE SERPL-SCNC: 101 MMOL/L (ref 99–110)
CO2 SERPL-SCNC: 18 MMOL/L (ref 21–32)
CREAT SERPL-MCNC: 0.8 MG/DL (ref 0.6–1.1)
DEPRECATED RDW RBC AUTO: 13.5 % (ref 12.4–15.4)
EOSINOPHIL # BLD: 0 K/UL (ref 0–0.6)
EOSINOPHIL NFR BLD: 0.1 %
ETHANOLAMINE SERPL-MCNC: 94 MG/DL (ref 0–0.08)
GFR SERPLBLD CREATININE-BSD FMLA CKD-EPI: >90 ML/MIN/{1.73_M2}
GLUCOSE SERPL-MCNC: 112 MG/DL (ref 70–99)
HCG SERPL QL: NEGATIVE
HCT VFR BLD AUTO: 37.9 % (ref 36–48)
HGB BLD-MCNC: 12.8 G/DL (ref 12–16)
LYMPHOCYTES # BLD: 1.1 K/UL (ref 1–5.1)
LYMPHOCYTES NFR BLD: 13.5 %
MAGNESIUM SERPL-MCNC: 1.76 MG/DL (ref 1.8–2.4)
MCH RBC QN AUTO: 30.1 PG (ref 26–34)
MCHC RBC AUTO-ENTMCNC: 33.6 G/DL (ref 31–36)
MCV RBC AUTO: 89.4 FL (ref 80–100)
MONOCYTES # BLD: 0.5 K/UL (ref 0–1.3)
MONOCYTES NFR BLD: 5.9 %
NEUTROPHILS # BLD: 6.7 K/UL (ref 1.7–7.7)
NEUTROPHILS NFR BLD: 80.2 %
PLATELET # BLD AUTO: 228 K/UL (ref 135–450)
PMV BLD AUTO: 8.2 FL (ref 5–10.5)
POTASSIUM SERPL-SCNC: 3.1 MMOL/L (ref 3.5–5.1)
PROT SERPL-MCNC: 6.6 G/DL (ref 6.4–8.2)
RBC # BLD AUTO: 4.25 M/UL (ref 4–5.2)
SALICYLATES SERPL-MCNC: <0.5 MG/DL (ref 15–30)
SODIUM SERPL-SCNC: 138 MMOL/L (ref 136–145)
WBC # BLD AUTO: 8.3 K/UL (ref 4–11)

## 2025-06-05 PROCEDURE — 36415 COLL VENOUS BLD VENIPUNCTURE: CPT

## 2025-06-05 PROCEDURE — 82077 ASSAY SPEC XCP UR&BREATH IA: CPT

## 2025-06-05 PROCEDURE — 84703 CHORIONIC GONADOTROPIN ASSAY: CPT

## 2025-06-05 PROCEDURE — 80053 COMPREHEN METABOLIC PANEL: CPT

## 2025-06-05 PROCEDURE — 80179 DRUG ASSAY SALICYLATE: CPT

## 2025-06-05 PROCEDURE — 99285 EMERGENCY DEPT VISIT HI MDM: CPT

## 2025-06-05 PROCEDURE — 83735 ASSAY OF MAGNESIUM: CPT

## 2025-06-05 PROCEDURE — 6360000002 HC RX W HCPCS: Performed by: EMERGENCY MEDICINE

## 2025-06-05 PROCEDURE — 85025 COMPLETE CBC W/AUTO DIFF WBC: CPT

## 2025-06-05 PROCEDURE — 80143 DRUG ASSAY ACETAMINOPHEN: CPT

## 2025-06-05 PROCEDURE — 96372 THER/PROPH/DIAG INJ SC/IM: CPT

## 2025-06-05 RX ORDER — DIPHENHYDRAMINE HYDROCHLORIDE 50 MG/ML
50 INJECTION, SOLUTION INTRAMUSCULAR; INTRAVENOUS ONCE
Status: COMPLETED | OUTPATIENT
Start: 2025-06-05 | End: 2025-06-05

## 2025-06-05 RX ORDER — HALOPERIDOL 5 MG/ML
5 INJECTION INTRAMUSCULAR ONCE
Status: COMPLETED | OUTPATIENT
Start: 2025-06-05 | End: 2025-06-05

## 2025-06-05 RX ORDER — MIDAZOLAM HYDROCHLORIDE 1 MG/ML
2 INJECTION, SOLUTION INTRAMUSCULAR; INTRAVENOUS ONCE
Status: COMPLETED | OUTPATIENT
Start: 2025-06-05 | End: 2025-06-05

## 2025-06-05 RX ADMIN — DIPHENHYDRAMINE HYDROCHLORIDE 50 MG: 50 INJECTION INTRAMUSCULAR; INTRAVENOUS at 23:06

## 2025-06-05 RX ADMIN — HALOPERIDOL LACTATE 5 MG: 5 INJECTION, SOLUTION INTRAMUSCULAR at 23:06

## 2025-06-05 RX ADMIN — MIDAZOLAM HYDROCHLORIDE 2 MG: 1 INJECTION, SOLUTION INTRAMUSCULAR; INTRAVENOUS at 23:06

## 2025-06-06 VITALS
HEIGHT: 63 IN | WEIGHT: 107.8 LBS | RESPIRATION RATE: 16 BRPM | TEMPERATURE: 98.1 F | DIASTOLIC BLOOD PRESSURE: 57 MMHG | HEART RATE: 64 BPM | OXYGEN SATURATION: 98 % | BODY MASS INDEX: 19.1 KG/M2 | SYSTOLIC BLOOD PRESSURE: 109 MMHG

## 2025-06-06 PROBLEM — F39 MOOD DISORDER: Status: ACTIVE | Noted: 2025-06-06

## 2025-06-06 LAB
ETHANOLAMINE SERPL-MCNC: 45 MG/DL (ref 0–0.08)
TSH SERPL DL<=0.005 MIU/L-ACNC: 5.09 UIU/ML (ref 0.27–4.2)

## 2025-06-06 PROCEDURE — 36415 COLL VENOUS BLD VENIPUNCTURE: CPT

## 2025-06-06 PROCEDURE — 82077 ASSAY SPEC XCP UR&BREATH IA: CPT

## 2025-06-06 PROCEDURE — 84443 ASSAY THYROID STIM HORMONE: CPT

## 2025-06-06 PROCEDURE — G0378 HOSPITAL OBSERVATION PER HR: HCPCS

## 2025-06-06 PROCEDURE — 6370000000 HC RX 637 (ALT 250 FOR IP): Performed by: EMERGENCY MEDICINE

## 2025-06-06 RX ORDER — DIPHENHYDRAMINE HYDROCHLORIDE 50 MG/ML
50 INJECTION, SOLUTION INTRAMUSCULAR; INTRAVENOUS EVERY 4 HOURS PRN
Status: DISCONTINUED | OUTPATIENT
Start: 2025-06-06 | End: 2025-06-07 | Stop reason: HOSPADM

## 2025-06-06 RX ORDER — HYDROXYZINE HYDROCHLORIDE 50 MG/1
50 TABLET, FILM COATED ORAL 3 TIMES DAILY PRN
Status: DISCONTINUED | OUTPATIENT
Start: 2025-06-06 | End: 2025-06-07 | Stop reason: HOSPADM

## 2025-06-06 RX ORDER — TRAZODONE HYDROCHLORIDE 50 MG/1
50 TABLET ORAL NIGHTLY PRN
Status: DISCONTINUED | OUTPATIENT
Start: 2025-06-06 | End: 2025-06-07 | Stop reason: HOSPADM

## 2025-06-06 RX ORDER — LORAZEPAM 1 MG/1
1 TABLET ORAL ONCE
Status: COMPLETED | OUTPATIENT
Start: 2025-06-06 | End: 2025-06-06

## 2025-06-06 RX ORDER — MAGNESIUM HYDROXIDE/ALUMINUM HYDROXICE/SIMETHICONE 120; 1200; 1200 MG/30ML; MG/30ML; MG/30ML
30 SUSPENSION ORAL EVERY 6 HOURS PRN
Status: DISCONTINUED | OUTPATIENT
Start: 2025-06-06 | End: 2025-06-07 | Stop reason: HOSPADM

## 2025-06-06 RX ORDER — NICOTINE 21 MG/24HR
1 PATCH, TRANSDERMAL 24 HOURS TRANSDERMAL DAILY
Status: DISCONTINUED | OUTPATIENT
Start: 2025-06-06 | End: 2025-06-06

## 2025-06-06 RX ORDER — IBUPROFEN 400 MG/1
400 TABLET, FILM COATED ORAL EVERY 6 HOURS PRN
Status: DISCONTINUED | OUTPATIENT
Start: 2025-06-06 | End: 2025-06-07 | Stop reason: HOSPADM

## 2025-06-06 RX ORDER — POLYETHYLENE GLYCOL 3350 17 G
2 POWDER IN PACKET (EA) ORAL
Status: DISCONTINUED | OUTPATIENT
Start: 2025-06-06 | End: 2025-06-07 | Stop reason: HOSPADM

## 2025-06-06 RX ORDER — POLYETHYLENE GLYCOL 3350 17 G
4 POWDER IN PACKET (EA) ORAL
Status: DISCONTINUED | OUTPATIENT
Start: 2025-06-06 | End: 2025-06-06 | Stop reason: HOSPADM

## 2025-06-06 RX ORDER — OLANZAPINE 5 MG/1
5 TABLET, FILM COATED ORAL EVERY 4 HOURS PRN
Status: DISCONTINUED | OUTPATIENT
Start: 2025-06-06 | End: 2025-06-07 | Stop reason: HOSPADM

## 2025-06-06 RX ORDER — ACETAMINOPHEN 325 MG/1
650 TABLET ORAL EVERY 4 HOURS PRN
Status: DISCONTINUED | OUTPATIENT
Start: 2025-06-06 | End: 2025-06-07 | Stop reason: HOSPADM

## 2025-06-06 RX ORDER — NICOTINE 21 MG/24HR
1 PATCH, TRANSDERMAL 24 HOURS TRANSDERMAL ONCE
Status: COMPLETED | OUTPATIENT
Start: 2025-06-06 | End: 2025-06-07

## 2025-06-06 RX ADMIN — LORAZEPAM 1 MG: 1 TABLET ORAL at 05:45

## 2025-06-06 ASSESSMENT — SLEEP AND FATIGUE QUESTIONNAIRES
AVERAGE NUMBER OF SLEEP HOURS: 6
DO YOU USE A SLEEP AID: NO
DO YOU HAVE DIFFICULTY SLEEPING: YES
SLEEP PATTERN: RESTLESSNESS

## 2025-06-06 ASSESSMENT — PATIENT HEALTH QUESTIONNAIRE - PHQ9
2. FEELING DOWN, DEPRESSED OR HOPELESS: NEARLY EVERY DAY
4. FEELING TIRED OR HAVING LITTLE ENERGY: NEARLY EVERY DAY
8. MOVING OR SPEAKING SO SLOWLY THAT OTHER PEOPLE COULD HAVE NOTICED. OR THE OPPOSITE, BEING SO FIGETY OR RESTLESS THAT YOU HAVE BEEN MOVING AROUND A LOT MORE THAN USUAL: MORE THAN HALF THE DAYS
9. THOUGHTS THAT YOU WOULD BE BETTER OFF DEAD, OR OF HURTING YOURSELF: SEVERAL DAYS
7. TROUBLE CONCENTRATING ON THINGS, SUCH AS READING THE NEWSPAPER OR WATCHING TELEVISION: NEARLY EVERY DAY
10. IF YOU CHECKED OFF ANY PROBLEMS, HOW DIFFICULT HAVE THESE PROBLEMS MADE IT FOR YOU TO DO YOUR WORK, TAKE CARE OF THINGS AT HOME, OR GET ALONG WITH OTHER PEOPLE: EXTREMELY DIFFICULT
SUM OF ALL RESPONSES TO PHQ QUESTIONS 1-9: 21
3. TROUBLE FALLING OR STAYING ASLEEP: NEARLY EVERY DAY
SUM OF ALL RESPONSES TO PHQ QUESTIONS 1-9: 21
SUM OF ALL RESPONSES TO PHQ QUESTIONS 1-9: 20
5. POOR APPETITE OR OVEREATING: NEARLY EVERY DAY
1. LITTLE INTEREST OR PLEASURE IN DOING THINGS: MORE THAN HALF THE DAYS
6. FEELING BAD ABOUT YOURSELF - OR THAT YOU ARE A FAILURE OR HAVE LET YOURSELF OR YOUR FAMILY DOWN: SEVERAL DAYS
SUM OF ALL RESPONSES TO PHQ QUESTIONS 1-9: 21

## 2025-06-06 ASSESSMENT — PAIN SCALES - GENERAL: PAINLEVEL_OUTOF10: 0

## 2025-06-06 ASSESSMENT — LIFESTYLE VARIABLES
HOW MANY STANDARD DRINKS CONTAINING ALCOHOL DO YOU HAVE ON A TYPICAL DAY: PATIENT DOES NOT DRINK
HOW OFTEN DO YOU HAVE A DRINK CONTAINING ALCOHOL: NEVER

## 2025-06-06 NOTE — ED NOTES
Patient became agitated wanting to leave. Became aggressive towards staff. Became a danger to self and other.   4 point hard restraints applied. Meds ordered and administered.     MD made aware.

## 2025-06-06 NOTE — ED NOTES
I called and spoke to clinical. They state to have the access center call back and see if we will accept the patient here due to having open beds at this time. Access center was called and made aware.
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
## 2025-06-06 NOTE — ED NOTES
Violent Restraint Face-to-Face Evaluation  (must be completed within one hour of initiation of restraints)      Evaluate immediate situation:  Agitated and aggressive towards staff.     Reaction to intervention: Yelling, kicking, and screaming     Medical Condition/Assessment: WNL    Behavioral Condition/Assessment: Tearful, intoxicated, aggressive     The patient’s review of systems, history, medications, and recent labs were reviewed at this time.     Continue/Discontinue restraints at this time: Continue    One-Hour Review Evaluation Physician Notification:    Provider Notified (Name):  Dr. Gomes     Date  6/5/25     Time  1404         Physician or Designated One-Hour Reviewer  Genny Guzman RN

## 2025-06-06 NOTE — ED NOTES
Sitter remains at the bedside due to patient being on a hold and elopement precautions. The patient is resting comfortably in bed with no concerns at this time.

## 2025-06-06 NOTE — BH NOTE
4 Eyes Skin Assessment     NAME:  Amalia Henson  YOB: 2005  MEDICAL RECORD NUMBER:  [id number]    The patient is being assessed for  Admission    I agree that at least one RN has performed a thorough Head to Toe Skin Assessment on the patient. ALL assessment sites listed below have been assessed.      Areas assessed by both nurses:    Head, Face, Ears, Shoulders, Back, Chest, Arms, Elbows, Hands, Sacrum. Buttock, Coccyx, Ischium, and Legs. Feet and Heels        Does the Patient have a Wound? No noted wound(s)       Alexis Prevention initiated by RN: No  Wound Care Orders initiated by RN: No    Pressure Injury (Stage 3,4, Unstageable, DTI, NWPT, and Complex wounds) if present, place Wound referral order by RN under : No    New Ostomies, if present place, Ostomy referral order under : No     Nurse 1 eSignature: Electronically signed by Luke Farias RN on 6/6/25 at 5:02 PM EDT    **SHARE this note so that the co-signing nurse can place an eSignature**    Nurse 2 eSignature: Electronically signed by Willie Casrtejon RN (Peters) on 6/6/25 at 6:53 PM EDT

## 2025-06-06 NOTE — ED NOTES
Transfer Center Handoff for Behavioral Health Transfers      Patient's Current Location: [facility] EMERGENCY DEPARTMENT     Chief Complaint   Patient presents with    Alcohol Intoxication       Current or History of Violent Behavior: Yes    Currently in Restraints Now or During this Encounter: Yes  (Specify if Agitation or self harm is noted in ED?)  If yes, please describe behaviors requiring restraint: pt was being violent with staff       Hard/2 point Opposing Wrist/Ankle    Medical Clearance Documented and Verified in the Chart: Yes    No Known Allergies     Can Patient Tolerate Lying Flat: Yes    Able to Perform ADLs:  Yes  (Specify if able to ambulate or uses any mobility devices such as cane or walker)  Activity:    Level of Assistance:    Assistive Device:    Miscellaneous Devices:      LABS    CBC:   Lab Results   Component Value Date/Time    WBC 8.3 06/05/2025 11:18 PM    RBC 4.25 06/05/2025 11:18 PM    HGB 12.8 06/05/2025 11:18 PM    HCT 37.9 06/05/2025 11:18 PM    MCV 89.4 06/05/2025 11:18 PM    MCH 30.1 06/05/2025 11:18 PM    MCHC 33.6 06/05/2025 11:18 PM    RDW 13.5 06/05/2025 11:18 PM     06/05/2025 11:18 PM    MPV 8.2 06/05/2025 11:18 PM     CMP:   Lab Results   Component Value Date/Time     06/05/2025 11:18 PM    K 3.1 06/05/2025 11:18 PM     06/05/2025 11:18 PM    CO2 18 06/05/2025 11:18 PM    BUN 10 06/05/2025 11:18 PM    CREATININE 0.8 06/05/2025 11:18 PM    AGRATIO 2.0 06/05/2025 11:18 PM    LABGLOM >90 06/05/2025 11:18 PM    GLUCOSE 112 06/05/2025 11:18 PM    CALCIUM 8.7 06/05/2025 11:18 PM    BILITOT 0.4 06/05/2025 11:18 PM    ALKPHOS 51 06/05/2025 11:18 PM    AST 23 06/05/2025 11:18 PM    ALT 13 06/05/2025 11:18 PM     Drug Panel: No results found for: \"AMPHETAMUR\", \"BARBITURATUR\", \"COCAINEUR\", \"METHADU\", \"OPIAU\", \"THCUR\", \"LABCOMM\"  UA:No results found for: \"COLORU\", \"APPEARANCE\", \"LABPH\", \"PROTEINU\", \"GLUCOSEU\", \"KETUA\", \"BILIRUBINUR\", \"BLOODU\", \"UROBILINOGEN\",  units)   Date Value   07/03/2024 Not Detected              Immunization status:   Immunization History   Administered Date(s) Administered    COVID-19, PFIZER PURPLE top, DILUTE for use, (age 12 y+), 30mcg/0.3mL 09/23/2021, 10/18/2021

## 2025-06-06 NOTE — VIRTUAL HEALTH
Glom Filt Rate >90 >60    Calcium 8.7 8.3 - 10.6 mg/dL    Total Protein 6.6 6.4 - 8.2 g/dL    Albumin 4.4 3.4 - 5.0 g/dL    Albumin/Globulin Ratio 2.0 1.1 - 2.2    Total Bilirubin 0.4 0.0 - 1.0 mg/dL    Alkaline Phosphatase 51 40 - 129 U/L    ALT 13 10 - 40 U/L    AST 23 15 - 37 U/L   CBC with Auto Differential    Collection Time: 06/05/25 11:18 PM   Result Value Ref Range    WBC 8.3 4.0 - 11.0 K/uL    RBC 4.25 4.00 - 5.20 M/uL    Hemoglobin 12.8 12.0 - 16.0 g/dL    Hematocrit 37.9 36.0 - 48.0 %    MCV 89.4 80.0 - 100.0 fL    MCH 30.1 26.0 - 34.0 pg    MCHC 33.6 31.0 - 36.0 g/dL    RDW 13.5 12.4 - 15.4 %    Platelets 228 135 - 450 K/uL    MPV 8.2 5.0 - 10.5 fL    Neutrophils % 80.2 %    Lymphocytes % 13.5 %    Monocytes % 5.9 %    Eosinophils % 0.1 %    Basophils % 0.3 %    Neutrophils Absolute 6.7 1.7 - 7.7 K/uL    Lymphocytes Absolute 1.1 1.0 - 5.1 K/uL    Monocytes Absolute 0.5 0.0 - 1.3 K/uL    Eosinophils Absolute 0.0 0.0 - 0.6 K/uL    Basophils Absolute 0.0 0.0 - 0.2 K/uL   Magnesium    Collection Time: 06/05/25 11:18 PM   Result Value Ref Range    Magnesium 1.76 (L) 1.80 - 2.40 mg/dL   Ethanol    Collection Time: 06/06/25  2:25 AM   Result Value Ref Range    Ethanol Lvl 45 mg/dL       Imaging:   No orders to display     Radiology:  No results found.    Review of Systems:  Reports no current cardiovascular, respiratory, gastrointestinal, genitourinary, integumentary, neurological, musculoskeletal, or immunological symptoms today.   PSYCHIATRIC: See HPI above.    PSYCHIATRIC EXAMINATION / MENTAL STATUS EXAM    Level of consciousness:  lethargic   Appearance:  street clothes and seated in bed.  Does appear stated age. No acute distress.  Behavior/Motor: no psychomotor agitation, retardation, or abnormal movements noted  Attitude toward examiner:  cooperative and attentive  SI/HI:Denies SI/HI  Sleep: Decreased  Speech:  spontaneous, normal rate, and normal volume ,  normal tone  Mood: Flat  Affect:  euthymic,  visit: Yes, I confirm.   Hoh Consult to Tele-Psych  Consult performed by: Mercedes Ruiz APRN - CNP  Consult ordered by: Stephanie Hunter,   Reason for consult: psychiatric evaluation         Total time spent on this encounter: Not billed by time    --MISTY Forman CNP on 6/6/2025 at 3:44 AM    An electronic signature was used to authenticate this note.
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
## 2025-06-06 NOTE — ED NOTES
MD at bedside. Pt not being cooperative at this time. Medications ordered per Dr. Hunter, please see orders.

## 2025-06-06 NOTE — PLAN OF CARE
Problem: Risk for Elopement  Goal: Patient will not exit the unit/facility without proper excort  Outcome: Progressing

## 2025-06-06 NOTE — BH NOTE
[facility]  Admission Note     Admission Type:   Admission Type: Involuntary    Reason for admission:  Reason for Admission: mood disorder      Addictive Behavior:   Addictive Behavior  In the Past 3 Months, Have You Felt or Has Someone Told You That You Have a Problem With  : None    Medical Problems:   Past Medical History:   Diagnosis Date    Anxiety     Depression     PTSD (post-traumatic stress disorder)        Status EXAM:  Mental Status and Behavioral Exam  Normal: No  Level of Assistance: Independent/Self  Facial Expression: Flat  Affect: Blunt, Constricted  Level of Consciousness: Confused  Frequency of Checks: 4 times per hour, close  Mood:Normal: No  Mood: Anxious, Irritable, Helpless, Depressed, Guilty  Motor Activity:Normal: Yes  Eye Contact: Fair  Observed Behavior: Friendly, Guarded, Cooperative  Sexual Misconduct History: Current - no  Preception: [place] to person, [place] to time, [place] to place  Attention:Normal: No  Attention: Distractible  Thought Processes: Unremarkable  Thought Content:Normal: No  Thought Content: Poverty of content  Depression Symptoms: Appetite change, Isolative, Loss of interest  Anxiety Symptoms: Generalized, Excessive sweating, Other (comment) (cierra)  Rebecca Symptoms: No problems reported or observed.  Hallucinations: Auditory (comment) (pt denies currently but states she has +AH that usually talk about or to the person patient is conversing with)  Delusions: Yes  Delusions: Paranoid (always feel like something is after me)  Memory:Normal: No  Memory: Poor recent  Insight and Judgment: No  Insight and Judgment: Poor judgment, Poor insight    Tobacco Screening:  Practical Counseling, on admission, julio X, if applicable and completed (first 3 are required if patient doesn't refuse):            ( ) Recognizing danger situations (included triggers and roadblocks)                    ( ) Coping skills (new ways to manage stress,relaxation techniques, changing

## 2025-06-07 PROCEDURE — 99239 HOSP IP/OBS DSCHRG MGMT >30: CPT

## 2025-06-07 PROCEDURE — G0378 HOSPITAL OBSERVATION PER HR: HCPCS
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
## 2025-06-07 NOTE — BH NOTE
In bed asleep at present.  Resps even and easy. Did not awaken for VS at this time d/t acting out behaviors from earlier this morning, demands to leave and banging on walls as per reported by off going shift.

## 2025-06-07 NOTE — H&P
Patient discharged prior to medial H&P able to be obtained. No charges filed for this encounter.    Lyssa Galeana PA-C 6/7/2025 11:24 AM

## 2025-06-07 NOTE — BH NOTE
Bridge Appointment completed: Reviewed Discharge Instructions with patient.    Patient verbalizes understanding and agreement with the discharge plan using the teachback method.     Referral for Outpatient Tobacco Cessation Counseling, upon discharge (julio X if applicable and completed):    ( )  Hospital staff assisted patient to call Quit Line or faxed referral                                   during hospitalization                  ( )  Recognizing danger situations (included triggers and roadblocks), if not completed on admission                    ( )  Coping skills (new ways to manage stress, exercise, relaxation techniques, changing routine, distraction), if not completed on admission                                                           ( )  Basic information about quitting (benefits of quitting, techniques in how to quit, available resources, if not completed on admission  ( ) [facility]   ( x) Patient refused referral  ( ) Patient refused counseling  ( ) Patient refused smoking cessation medication upon discharge    Vaccinations (julio X if applicable and completed):  ( ) Patient states already received influenza vaccine elsewhere  ( ) Patient received influenza vaccine during this hospitalization  ( ) Patient refused influenza vaccine at this time  (x ) Not offered

## 2025-06-07 NOTE — BH NOTE
[facility]  Discharge Note    Pt discharged with followings belongings:   Dental Appliances: None  Vision - Corrective Lenses: Contact Lenses  Hearing Aid: None  Jewelry: Necklace  Body Piercings Removed: Yes  Clothing: Footwear, Pants, Shirt  Other Valuables: Other (Comment)   Valuables sent home with or returned to patient. Patient educated on aftercare instructions: yes  I .Patient verbalize understanding of AVS:     Status EXAM upon discharge:  Mental Status and Behavioral Exam  Normal: No  Level of Assistance: Independent/Self  Facial Expression: Flat, Sad  Affect: Constricted  Level of Consciousness: Alert  Frequency of Checks: 4 times per hour, close  Mood:Normal: No  Mood: Angry, Irritable  Motor Activity:Normal: No  Motor Activity: Decreased  Eye Contact: Good  Observed Behavior: Guarded, Impulsive  Sexual Misconduct History: Current - no  Preception: [place] to person, [place] to time, [place] to place, [place] to situation  Attention:Normal: Yes  Attention: Distractible  Thought Processes: Unremarkable  Thought Content:Normal: No  Thought Content: Poverty of content  Depression Symptoms: Appetite change, Isolative, Change in energy level  Anxiety Symptoms: No problems reported or observed.  Rebecca Symptoms: No problems reported or observed.  Hallucinations: None  Delusions: No  Delusions: Persecutory  Memory:Normal: No  Memory: Poor recent  Insight and Judgment: No  Insight and Judgment: Poor judgment, Poor insight    Tobacco Screening:  Practical Counseling, on admission, julio X, if applicable and completed (first 3 are required if patient doesn't refuse)           ( ) Recognizing danger situations (included triggers and roadblocks)                    ( ) Coping skills (new ways to manage stress,relaxation techniques, changing routine, distraction)                                                           ( ) Basic information about quitting (benefits of quitting, techniques in how to quit,

## 2025-06-07 NOTE — PLAN OF CARE
Problem: Psychosis  Goal: Will report no hallucinations or delusions  6/7/2025 1101 by Debra Benito RN  Outcome: Progressing     Problem: Behavior  Goal: Pt/Family maintain appropriate behavior and adhere to behavioral management agreement, if implemented  6/7/2025 1101 by Debra Benito RN  Outcome: Progressing     Problem: Involuntary Admit  Goal: Will cooperate with staff recommendations and doctor's orders and will demonstrate appropriate behavior  6/7/2025 1101 by Debra Benito RN  Outcome: Progressing     Problem: Risk for Elopement  Goal: Patient will not exit the unit/facility without proper excort  6/7/2025 1101 by Debra Benito RN  Outcome: Progressing     Amalia was seen in her room, she is tearful and angry with being here. Prior to being seen by provider she was noted to be hitting the wall and shoving furniture in her room out of anger. After she was seen by CNP and told she was being discharged, she remained upset and tearful about the hospital bill and not having a job b/c she was a \"no call no show\" there.  She denies any SI,HI or AVH and reports she is safe to discharge. Pt denies any issues w/ bowels or bladder. Denies c/o pain.  Refused breakfast but did take some lunch before leaving in cab.  Encouraged pt to try and call her job and explain that she was hospitalized and maybe they would reconsider firing her.

## 2025-06-07 NOTE — PLAN OF CARE
Amalia is alert and oriented X3. She states she is here due to \"I guess I went out and a drink.\" She does not expound on her answers. She answers questions, but does not initiate conversation. She has made no paranoid statements and denies any hallucinations. She has not been noted to be responding to internal stimuli. She denies SI/HI. She has not been out of bed except to use the bathroom. She declines her supper. She declines to come out to the common area to watch TV or socialize. She denies any needs.

## 2025-06-09 ENCOUNTER — FOLLOWUP TELEPHONE ENCOUNTER (OUTPATIENT)
Dept: PSYCHIATRY | Age: 20
End: 2025-06-09

## 2025-06-10 ENCOUNTER — FOLLOWUP TELEPHONE ENCOUNTER (OUTPATIENT)
Dept: PSYCHIATRY | Age: 20
End: 2025-06-10

## 2025-06-11 ENCOUNTER — FOLLOWUP TELEPHONE ENCOUNTER (OUTPATIENT)
Dept: PSYCHIATRY | Age: 20
End: 2025-06-11

## 2025-07-08 ENCOUNTER — CLINICAL DOCUMENTATION (OUTPATIENT)
Age: 20
End: 2025-07-08

## 2025-07-08 NOTE — PROGRESS NOTES
Pt had screened positively for loneliness/social isolation during recent hospitalization.  attempted to reach Pt to offer outpatient telechaplaincy support. No answer on phone. Left voicemail for Pt, informing her of our continued availability for support.    Judson Jules